# Patient Record
Sex: MALE | Race: WHITE | Employment: OTHER | URBAN - METROPOLITAN AREA
[De-identification: names, ages, dates, MRNs, and addresses within clinical notes are randomized per-mention and may not be internally consistent; named-entity substitution may affect disease eponyms.]

---

## 2021-06-28 ENCOUNTER — APPOINTMENT (OUTPATIENT)
Dept: GENERAL RADIOLOGY | Age: 71
End: 2021-06-28
Payer: COMMERCIAL

## 2021-06-28 ENCOUNTER — HOSPITAL ENCOUNTER (EMERGENCY)
Age: 71
Discharge: LEFT BEFORE DISCHARGE | End: 2021-06-28
Attending: EMERGENCY MEDICINE
Payer: COMMERCIAL

## 2021-06-28 VITALS
BODY MASS INDEX: 30.17 KG/M2 | SYSTOLIC BLOOD PRESSURE: 142 MMHG | TEMPERATURE: 96.9 F | OXYGEN SATURATION: 93 % | HEIGHT: 70 IN | DIASTOLIC BLOOD PRESSURE: 110 MMHG | RESPIRATION RATE: 34 BRPM | WEIGHT: 210.76 LBS | HEART RATE: 126 BPM

## 2021-06-28 DIAGNOSIS — I48.91 ATRIAL FIBRILLATION WITH RAPID VENTRICULAR RESPONSE (HCC): Primary | ICD-10-CM

## 2021-06-28 DIAGNOSIS — Z53.29 LEFT AGAINST MEDICAL ADVICE: ICD-10-CM

## 2021-06-28 DIAGNOSIS — I50.9 ACUTE CONGESTIVE HEART FAILURE, UNSPECIFIED HEART FAILURE TYPE (HCC): ICD-10-CM

## 2021-06-28 LAB
ANION GAP SERPL CALCULATED.3IONS-SCNC: 13 MMOL/L (ref 3–16)
BASOPHILS ABSOLUTE: 0 K/UL (ref 0–0.2)
BASOPHILS RELATIVE PERCENT: 0.5 %
BUN BLDV-MCNC: 24 MG/DL (ref 7–20)
CALCIUM SERPL-MCNC: 9.3 MG/DL (ref 8.3–10.6)
CHLORIDE BLD-SCNC: 107 MMOL/L (ref 99–110)
CO2: 20 MMOL/L (ref 21–32)
CREAT SERPL-MCNC: 0.9 MG/DL (ref 0.8–1.3)
EKG ATRIAL RATE: 197 BPM
EKG DIAGNOSIS: NORMAL
EKG Q-T INTERVAL: 236 MS
EKG QRS DURATION: 74 MS
EKG QTC CALCULATION (BAZETT): 417 MS
EKG R AXIS: 109 DEGREES
EKG T AXIS: 182 DEGREES
EKG VENTRICULAR RATE: 188 BPM
EOSINOPHILS ABSOLUTE: 0 K/UL (ref 0–0.6)
EOSINOPHILS RELATIVE PERCENT: 0.7 %
GFR AFRICAN AMERICAN: >60
GFR NON-AFRICAN AMERICAN: >60
GLUCOSE BLD-MCNC: 109 MG/DL (ref 70–99)
HCT VFR BLD CALC: 46.8 % (ref 40.5–52.5)
HEMOGLOBIN: 16 G/DL (ref 13.5–17.5)
LYMPHOCYTES ABSOLUTE: 1.6 K/UL (ref 1–5.1)
LYMPHOCYTES RELATIVE PERCENT: 24.1 %
MCH RBC QN AUTO: 31.6 PG (ref 26–34)
MCHC RBC AUTO-ENTMCNC: 34.3 G/DL (ref 31–36)
MCV RBC AUTO: 92.3 FL (ref 80–100)
MONOCYTES ABSOLUTE: 0.6 K/UL (ref 0–1.3)
MONOCYTES RELATIVE PERCENT: 8.9 %
NEUTROPHILS ABSOLUTE: 4.4 K/UL (ref 1.7–7.7)
NEUTROPHILS RELATIVE PERCENT: 65.8 %
PDW BLD-RTO: 13.5 % (ref 12.4–15.4)
PLATELET # BLD: 162 K/UL (ref 135–450)
PMV BLD AUTO: 8.5 FL (ref 5–10.5)
POTASSIUM REFLEX MAGNESIUM: 4.4 MMOL/L (ref 3.5–5.1)
PRO-BNP: 4651 PG/ML (ref 0–124)
RBC # BLD: 5.07 M/UL (ref 4.2–5.9)
SODIUM BLD-SCNC: 140 MMOL/L (ref 136–145)
T4 TOTAL: 10.4 UG/DL (ref 4.5–10.9)
TROPONIN: <0.01 NG/ML
TSH SERPL DL<=0.05 MIU/L-ACNC: 0.97 UIU/ML (ref 0.27–4.2)
WBC # BLD: 6.7 K/UL (ref 4–11)

## 2021-06-28 PROCEDURE — 93005 ELECTROCARDIOGRAM TRACING: CPT | Performed by: EMERGENCY MEDICINE

## 2021-06-28 PROCEDURE — 84436 ASSAY OF TOTAL THYROXINE: CPT

## 2021-06-28 PROCEDURE — 99285 EMERGENCY DEPT VISIT HI MDM: CPT

## 2021-06-28 PROCEDURE — 80048 BASIC METABOLIC PNL TOTAL CA: CPT

## 2021-06-28 PROCEDURE — 85025 COMPLETE CBC W/AUTO DIFF WBC: CPT

## 2021-06-28 PROCEDURE — 71046 X-RAY EXAM CHEST 2 VIEWS: CPT

## 2021-06-28 PROCEDURE — 96376 TX/PRO/DX INJ SAME DRUG ADON: CPT

## 2021-06-28 PROCEDURE — 83880 ASSAY OF NATRIURETIC PEPTIDE: CPT

## 2021-06-28 PROCEDURE — 2500000003 HC RX 250 WO HCPCS: Performed by: EMERGENCY MEDICINE

## 2021-06-28 PROCEDURE — 84443 ASSAY THYROID STIM HORMONE: CPT

## 2021-06-28 PROCEDURE — 96365 THER/PROPH/DIAG IV INF INIT: CPT

## 2021-06-28 PROCEDURE — 93010 ELECTROCARDIOGRAM REPORT: CPT | Performed by: INTERNAL MEDICINE

## 2021-06-28 PROCEDURE — 6370000000 HC RX 637 (ALT 250 FOR IP): Performed by: EMERGENCY MEDICINE

## 2021-06-28 PROCEDURE — 2580000003 HC RX 258: Performed by: EMERGENCY MEDICINE

## 2021-06-28 PROCEDURE — 96366 THER/PROPH/DIAG IV INF ADDON: CPT

## 2021-06-28 PROCEDURE — 71045 X-RAY EXAM CHEST 1 VIEW: CPT

## 2021-06-28 PROCEDURE — 84484 ASSAY OF TROPONIN QUANT: CPT

## 2021-06-28 RX ORDER — METRONIDAZOLE 7.5 MG/G
1 GEL TOPICAL 2 TIMES DAILY PRN
COMMUNITY

## 2021-06-28 RX ORDER — FUROSEMIDE 20 MG/1
20 TABLET ORAL DAILY
Qty: 5 TABLET | Refills: 0 | Status: SHIPPED | OUTPATIENT
Start: 2021-06-28 | End: 2021-08-05 | Stop reason: SDUPTHER

## 2021-06-28 RX ORDER — DILTIAZEM HYDROCHLORIDE 5 MG/ML
10 INJECTION INTRAVENOUS ONCE
Status: COMPLETED | OUTPATIENT
Start: 2021-06-28 | End: 2021-06-28

## 2021-06-28 RX ORDER — FUROSEMIDE 40 MG/1
40 TABLET ORAL ONCE
Status: COMPLETED | OUTPATIENT
Start: 2021-06-28 | End: 2021-06-28

## 2021-06-28 RX ORDER — DILTIAZEM HYDROCHLORIDE 180 MG/1
180 CAPSULE, COATED, EXTENDED RELEASE ORAL DAILY
Qty: 30 CAPSULE | Refills: 0 | Status: SHIPPED | OUTPATIENT
Start: 2021-06-28 | End: 2021-07-21 | Stop reason: SDUPTHER

## 2021-06-28 RX ORDER — LEVOTHYROXINE SODIUM 0.2 MG/1
200 TABLET ORAL DAILY
COMMUNITY
Start: 2021-04-03

## 2021-06-28 RX ORDER — POTASSIUM CHLORIDE 750 MG/1
10 TABLET, EXTENDED RELEASE ORAL DAILY
Qty: 5 TABLET | Refills: 0 | Status: SHIPPED | OUTPATIENT
Start: 2021-06-28 | End: 2021-07-03

## 2021-06-28 RX ORDER — FUROSEMIDE 10 MG/ML
20 INJECTION INTRAMUSCULAR; INTRAVENOUS ONCE
Status: DISCONTINUED | OUTPATIENT
Start: 2021-06-28 | End: 2021-06-28

## 2021-06-28 RX ORDER — FOLIC ACID 1 MG/1
1 TABLET ORAL DAILY
COMMUNITY

## 2021-06-28 RX ADMIN — DILTIAZEM HYDROCHLORIDE 10 MG/HR: 5 INJECTION INTRAVENOUS at 13:05

## 2021-06-28 RX ADMIN — FUROSEMIDE 40 MG: 40 TABLET ORAL at 15:37

## 2021-06-28 RX ADMIN — DILTIAZEM HYDROCHLORIDE 10 MG: 5 INJECTION INTRAVENOUS at 12:56

## 2021-06-28 ASSESSMENT — PAIN SCALES - GENERAL
PAINLEVEL_OUTOF10: 0
PAINLEVEL_OUTOF10: 0

## 2021-06-28 NOTE — ED NOTES
HR 160s at this time post cardizem bolus. Cardizem infusion initiated, see MAR. Pt is alert and oriented x4. Pt denies any pain. Call light within reach.       Marti Samaniego RN  06/28/21 0842

## 2021-06-28 NOTE — ED PROVIDER NOTES
[address] [facility]      Pt Name: Luz Goddard  MRN: [id number]  [place] 1950  Date of evaluation: 6/28/2021  Provider: Russell Morton, [address] [place] [address]       Chief Complaint   Patient presents with    Shortness of Breath     x 2-3 days. denies chest pain     Leg Swelling         HISTORY OF PRESENT ILLNESS   (Location/Symptom, Timing/Onset, Context/Setting, Quality, Duration, Modifying Factors, Severity)  Note limiting factors. Luz Goddard is a 73 y.o. male who presents to the emergency department with a complaint of feeling an irregular heartbeat for the last 2 to 3 days, worsening today. He reports progressively worsening shortness of breath and lower extremity swelling. He denies any associated chest pain heaviness pressure or tightness. He denies any fever or chills. No cough or sputum production. He denies any abdominal pain nausea vomiting or diarrhea. He denies any melena hematochezia. He denies any syncope dizziness or lightheadedness. He reports a similar episode 20 years ago at which time he was diagnosed with atrial fibrillation. He states that it was associated with his thyroid function. He currently takes Synthroid. He is compliant with his medications. He did require cardioversion at that time subacutely. He does not take any current anticoagulation or medications for A. fib. He does not see a cardiologist.    He denies any recent illness. He denies any recent heat exhaustion or heat exposure. Urine output has been normal.  Oral intake has been normal.    Nursing Notes were reviewed. HPI        REVIEW OF SYSTEMS    (2-9 systems for level 4, 10 or more for level 5)       Constitutional: Negative for fever or chills. HENT: Negative for rhinorrhea and sore throat. Eyes: Negative for redness or drainage. Gastrointestinal: Negative for abdominal pain. Negative for vomiting or diarrhea.    Genitourinary: Negative for flank pain. Negative for dysuria. Negative for hematuria. Neurological: Negative for headache. All systems are reviewed and are negative except for those listed above in the history of present illness and ROS. PAST MEDICAL HISTORY     Past Medical History:   Diagnosis Date    Thyroid disease          SURGICAL HISTORY     History reviewed. No pertinent surgical history. CURRENT MEDICATIONS       Previous Medications    FOLIC ACID (FOLVITE) 1 MG TABLET    Take 1 mg by mouth daily    LEVOTHYROXINE (SYNTHROID) 200 MCG TABLET    Take 200 mcg by mouth Daily     METRONIDAZOLE (METROGEL) 0.75 % GEL    Apply 1 g topically 2 times daily as needed (rosacea) Apply topically 2 times daily. ALLERGIES     Patient has no known allergies. FAMILY HISTORY     History reviewed. No pertinent family history. SOCIAL HISTORY       Social History     Socioeconomic History    Marital status:      Spouse name: None    Number of children: None    Years of education: None    Highest education level: None   Occupational History    None   Tobacco Use    Smoking status: Current Every Day Smoker     Types: Cigars    Smokeless tobacco: Never Used   Substance and Sexual Activity    Alcohol use: Yes     Comment: occ    Drug use: Never    Sexual activity: None   Other Topics Concern    None   Social History Narrative    None     Social Determinants of Health     Financial Resource Strain:     Difficulty of Paying Living Expenses:    Food Insecurity:     Worried About Running Out of Food in the Last Year:     Ran Out of Food in the Last Year:    Transportation Needs:     Lack of Transportation (Medical):      Lack of Transportation (Non-Medical):    Physical Activity:     Days of Exercise per Week:     Minutes of Exercise per Session:    Stress:     Feeling of Stress :    Social Connections:     Frequency of Communication with Friends and Family:     Frequency of Social Gatherings with Friends and Family:     Attends Alevism Services:     Active Member of Clubs or Organizations:     Attends Club or Organization Meetings:     Marital Status:    Intimate Partner Violence:     Fear of Current or Ex-Partner:     Emotionally Abused:     Physically Abused:     Sexually Abused:        SCREENINGS             PHYSICAL EXAM    (up to 7 for level 4, 8 or more for level 5)     ED Triage Vitals [06/28/21 1234]   BP Temp Temp Source Pulse Resp SpO2 Height Weight   127/79 96.9 °F (36.1 °C) Tympanic 136 17 99 % 5' 10\" (1.778 m) 210 lb 12.2 oz (95.6 kg)         Physical Exam   Constitutional: Awake and alert. Nontoxic. Awake and alert. Head: No visible evidence of trauma. Normocephalic. Eyes: Pupils equal and reactive. No photophobia. Conjunctiva normal.    HENT: Oral mucosa moist.  Airway patent. Pharynx without erythema. Nares were clear. Neck:  Soft and supple. Nontender. Heart: Rapid and irregular. Atrial fibrillation on the monitor. Rate 188. Lungs:  Clear to auscultation. No wheezes, rales, or ronchi. No conversational dyspnea or accessory muscle use. Chest: Chest wall non-tender. No evidence of trauma. Abdomen:  Soft, BMI 30.24. Nondistended, bowel sounds present. Nontender. No guarding rigidity or rebound. No masses. Musculoskeletal: Extremities non-tender with full range of motion. Radial and dorsalis pedis pulses were intact. Calf tenderness or erythema. There was 2-3+ bilateral lower extremity pitting edema extending to above the knees. Neurological: Alert and oriented x 3. Speech clear. Cranial nerves II-XII intact. No facial droop. No acute focal motor or sensory deficits. Skin: Skin is warm and dry. No rash. Lymphatic:  No lympadenopathy. Psychiatric: Normal mood and affect.  Behavior is normal.         DIAGNOSTIC RESULTS     EKG: All EKG's are interpreted by the Emergency Department Physician who either signs or Co-signs this chart in the absence of a cardiologist.    Atrial fibrillation with rapid ventricular response. Rate 188. QRS duration 74 ms. QTc 417 ms. R axis 109 degrees. No ST elevation. Nonspecific T wave abnormalities.     RADIOLOGY:   Non-plain film images such as CT, Ultrasound and MRI are read by the radiologist. Plain radiographic images are visualized and preliminarily interpreted by the emergency physician with the below findings:        Interpretation per the Radiologist below, if available at the time of this note:    XR CHEST PORTABLE   Final Result   Findings suggest mild interstitial and subpleural edema with postinflammatory   change in the right upper lobe               ED BEDSIDE ULTRASOUND:   Performed by ED Physician - none    LABS:  Labs Reviewed   BASIC METABOLIC PANEL W/ REFLEX TO MG FOR LOW K - Abnormal; Notable for the following components:       Result Value    CO2 20 (*)     Glucose 109 (*)     BUN 24 (*)     All other components within normal limits    Narrative:     Performed at:  [address] [employer] [address]   Phone [phone]   BRAIN NATRIURETIC PEPTIDE - Abnormal; Notable for the following components:    Pro-BNP 4,651 (*)     All other components within normal limits    Narrative:     Performed at:  [address] [employer] [address]   Phone [phone]   CBC WITH AUTO DIFFERENTIAL    Narrative:     Performed at:  [facility]  [address] [place] [address] [employer] [address]   Phone [phone]   TROPONIN    Narrative:     Performed at:  [facility]  [address] [place] [address] [email or web address] [address]   Phone [phone]   T4    Narrative:     Performed at:  [address] [email or web address] [address]   Phone [phone]   TSH WITHOUT REFLEX    Narrative:     Performed at:  [facility]  [address]   Phone [phone]       All other labs were within normal range or not returned as of this dictation. EMERGENCY DEPARTMENT COURSE and DIFFERENTIAL DIAGNOSIS/MDM:   Vitals:    Vitals:    06/28/21 1440 06/28/21 1516 06/28/21 1531 06/28/21 1538   BP:  121/85 (!) 142/110    Pulse: 147 135 143 126   Resp: 28 26 23 (!) 34   Temp:       TempSrc:       SpO2: 93%  93%    Weight:       Height:             MDM      The patient presents with palpitations for 2 to 3 days with progressively worsening shortness of breath and lower extremity edema. He is awake and alert. His blood pressure is 138/109. Heart rate is in the 180s. He is in atrial fibrillation on the monitor. EKG was obtained which confirms A. fib with RVR. Rate 188. No ST elevation. No associated chest pain. He was started on Cardizem bolus and drip. REASSESSMENT          2:01 PM: White blood cell count is 6.7. Hemoglobin 16.0. Creatinine 0.9. Chest x-ray shows mild increased interstitial markings consistent with congestive heart failure/pulmonary edema. He was given Lasix 20 mg IV. Heart rate is improved. Currently on 15 mg/h Cardizem drip his heart rate is 118-120. He is feeling improved. Is normal.  T4 and TSH are normal.  BNP is elevated at 4651. Patient will be admitted for further treatment and evaluation. 3:48 PM: The patient was feeling improved. Heart rate is 125 but he remains in atrial fibrillation with rapid ventricular response. He does have evidence of CHF. He is unsure of any exact weight gain but is certain that he has recently gained some weight. He will be admitted for further treatment and evaluation. He will need additional work-up with 2D echocardiogram, audiology evaluation, and possibly even ischemic work-up.   Troponin is normal.     I talked with the patient at length regarding his test results and need for admission, cardiology evaluation, and additional work-up, etc.  However, the patient is adamant that he cannot be admitted to the hospital and must be discharged immediately. He states that he is the primary caregiver for his wife who is at home and he cannot stay any longer. He refuses admission to the hospital.  He was advised of the risk of leaving [address] including but not limited to the possibility of delaying diagnosis, worsening of his condition, stroke, permanent disability, worsening congestive heart failure, myocardial infarction, or even sudden cardiac death. He is competent to make this decision. He demonstrates appropriate decision-making capacity. He verbalized complete understanding of the risk. He states \"I understand what you are saying, I know there is significant risk, but I absolutely cannot stay in the hospital, I need to leave right now\". Social work was consulted, and they evaluated the patient.  offered to place the patient's wife in respite care in a memory care unit while the patient is in the hospital.  He still declined and wishes to leave AMA. He will be given a referral to cardiology and was advised to call today for an appointment to be seen as soon as possible in 1 to 2 days. He was advised that he may return at any time to be admitted to the hospital.  If his condition worsens or new symptoms develop, he was advised to return immediately to the emergency department. I also advised him to follow-up with his primary care physician in 1 to 2 days for reexamination. He will be given a prescription for Cardizem 180 mg CD, once daily. He was advised to take his first dose as soon as he gets home. His [id number] 2 score is 3. Therefore, he will require anticoagulation and will be started on Eliquis 5 mg twice daily. There are no contraindications to anticoagulation.   He does have mild CHF and will be started on Lasix 20 mg daily as well as potassium chloride 10 mEq daily for 5 days. I advised him that treatment with these medications certainly does not indicate adequate or thorough evaluation for his congestive heart failure and that additional diuretics may be needed as well as additional medications as directed by the cardiologist.  He was advised of the importance of follow-up. He states that he plans to return to the emergency department for admission as soon as he can make arrangements for someone to stay with his wife. CRITICAL CARE TIME   Total Critical Care time was 45 minutes, excluding separately reportable procedures. There was a high probability of clinically significant/life threatening deterioration in the patient's condition which required my urgent intervention. CONSULTS:  None    PROCEDURES:  Unless otherwise noted below, none     Procedures        FINAL IMPRESSION      1. Atrial fibrillation with rapid ventricular response ([address]    2. Acute congestive heart failure, unspecified heart failure type ([address]    3. Left against medical advice          DISPOSITION/PLAN   DISPOSITION        PATIENT REFERRED TO:  Briant Paget, [address]  [phone]    Call today        DISCHARGE MEDICATIONS:  New Prescriptions    APIXABAN (ELIQUIS) 5 MG TABS TABLET    Take 1 tablet by mouth 2 times daily    DILTIAZEM (CARDIZEM CD) 180 MG EXTENDED RELEASE CAPSULE    Take 1 capsule by mouth daily    FUROSEMIDE (LASIX) 20 MG TABLET    Take 1 tablet by mouth daily    POTASSIUM CHLORIDE (KLOR-CON M) 10 MEQ EXTENDED RELEASE TABLET    Take 1 tablet by mouth daily for 5 days     Controlled Substances Monitoring:     No flowsheet data found. (Please note that portions of this note were completed with a voice recognition program.  Efforts were made to edit the dictations but occasionally words are mis-transcribed. )    RYLAND Agosto DO (electronically signed)  Attending Emergency Physician          Joe Jay Kirsten Craig, DO  06/28/21 [address] [facility], DO  06/28/21 8852

## 2021-06-28 NOTE — LETTER
[facility] Emergency Department  [address] [address]  Phone: [phone]    Patient: Tay Rubio  YOB: 1950  Date: 6/28/2021 Time: 3:35 PM    Leaving the Hospital Against Medical Advice    Chart #:[id number]    This will certify that I, the undersigned,    ______________________________________________________________________    A patient in the above named medical center, having requested discharge and removal from the medical center against the advice of my attending physician(s), hereby release the Emergency Department, its physicians, officers and employees, severally and individually, from any and all liability of any nature whatsoever for any injury or harm or complication of any kind that may result directly or indirectly, by reason of my terminating my stay as a patient from [facility], and hereby waive any and all rights of action I may now have or later acquire as a result of my voluntary departure from [facility] and the termination of my stay as a patient therein. This release is made with the full knowledge of the danger that may result from the action which I am taking.       Date:_______________________                         ___________________________                                                                                    Patient/Legal Representative    Witness:        ____________________________                          ___________________________  Nurse                                                                        Physician

## 2021-06-28 NOTE — ED NOTES
Discharge instructions and prescriptions discussed/given to pt, all questions answered. Pt is alert and oriented x4, skin warm and dry, respirations even and easy. Pt left ambulatory, steady gait.       Lilian Dominguez RN  06/28/21 1600

## 2021-06-28 NOTE — ED NOTES
Aure from Social work at bedside providing pt with resources at this time.      Darline Negrete RN  06/28/21 2460

## 2021-06-28 NOTE — PROGRESS NOTES
Medication Reconciliation    List of medications patient is currently taking is complete. Source of information: 1. Conversation with patient at bedside                                      2. EPIC records      Allergies  Patient has no known allergies. Notes regarding home medications:   1. Patient's only routine maintenance medications are Levothyroxine and Folic acid; he received both of these earlier this morning. Last TSH = 0.75 (01/2021).     Brooklyn Larson RPH, PharmD, BCPS  6/28/2021 1:13 PM

## 2021-06-28 NOTE — ED NOTES
Patient arrived to ED via private vehicle. Patient reports swelling to the bilateral lower extremities x3-4 days. Patient reports increased SOB today. Patient reports 20 years ago he had an episode of \"cardiac problems when my thyroid was not working right. \" Patient reports he is going to refuse admission d/t his wife being at home alone and she has Alzheimer. , Aure, spoke with patient but patient is refusing any assistance at this time. Patient made aware of the severity of him leaving. Patient reports, \"I will stay an hour or two then see what happens. \"       Shahab Granados RN  06/28/21 6723

## 2021-06-28 NOTE — ED NOTES
Pt still adamant about going home despite condition. Rae Rogers DO discussing risks of leaving at this time. Pt still requesting to get home.       Lilian Dominguez RN  06/28/21 4902

## 2021-07-21 NOTE — TELEPHONE ENCOUNTER
Flower Pineda called in this morning stating he was referred to Dr. Bridger Green after an ED visit. Flower Pineda stated he wants an appointment right away because he needs medication. Flower Boweners was in the ED for feet swelling, SOB, and afib. Please advise where to put this patient.        You can reach Flower Pineda at [phone]

## 2021-07-21 NOTE — TELEPHONE ENCOUNTER
Spoke with patient, he is scheduled on 08/05 at 2:30pm.     Alfredito Serrano stated he needs a refill before his appointment and wanted to know if Dr. Sunil Jay is able to refill them.      dilTIAZem (CARDIZEM CD) 180 MG extended release capsule     apixaban (ELIQUIS) 5 MG TABS tablet

## 2021-07-22 RX ORDER — DILTIAZEM HYDROCHLORIDE 180 MG/1
180 CAPSULE, COATED, EXTENDED RELEASE ORAL DAILY
Qty: 30 CAPSULE | Refills: 0 | Status: SHIPPED | OUTPATIENT
Start: 2021-07-22 | End: 2021-08-18 | Stop reason: SDUPTHER

## 2021-08-05 ENCOUNTER — OFFICE VISIT (OUTPATIENT)
Dept: CARDIOLOGY CLINIC | Age: 71
End: 2021-08-05
Payer: COMMERCIAL

## 2021-08-05 VITALS
HEIGHT: 70 IN | OXYGEN SATURATION: 95 % | WEIGHT: 206 LBS | SYSTOLIC BLOOD PRESSURE: 120 MMHG | BODY MASS INDEX: 29.49 KG/M2 | HEART RATE: 75 BPM | DIASTOLIC BLOOD PRESSURE: 78 MMHG

## 2021-08-05 DIAGNOSIS — R40.0 DAYTIME SOMNOLENCE: ICD-10-CM

## 2021-08-05 DIAGNOSIS — I50.22 CHRONIC SYSTOLIC CHF (CONGESTIVE HEART FAILURE), NYHA CLASS 3 (HCC): ICD-10-CM

## 2021-08-05 DIAGNOSIS — E03.8 OTHER SPECIFIED HYPOTHYROIDISM: ICD-10-CM

## 2021-08-05 DIAGNOSIS — I48.91 ATRIAL FIBRILLATION WITH RVR (HCC): Primary | ICD-10-CM

## 2021-08-05 PROCEDURE — G8417 CALC BMI ABV UP PARAM F/U: HCPCS | Performed by: INTERNAL MEDICINE

## 2021-08-05 PROCEDURE — G8427 DOCREV CUR MEDS BY ELIG CLIN: HCPCS | Performed by: INTERNAL MEDICINE

## 2021-08-05 PROCEDURE — 93000 ELECTROCARDIOGRAM COMPLETE: CPT | Performed by: INTERNAL MEDICINE

## 2021-08-05 PROCEDURE — 99205 OFFICE O/P NEW HI 60 MIN: CPT | Performed by: INTERNAL MEDICINE

## 2021-08-05 RX ORDER — FUROSEMIDE 40 MG/1
40 TABLET ORAL DAILY
Qty: 30 TABLET | Refills: 5 | Status: SHIPPED | OUTPATIENT
Start: 2021-08-05 | End: 2022-01-26 | Stop reason: SDUPTHER

## 2021-08-05 RX ORDER — AMIODARONE HYDROCHLORIDE 200 MG/1
200 TABLET ORAL 2 TIMES DAILY
Qty: 60 TABLET | Refills: 3 | Status: SHIPPED | OUTPATIENT
Start: 2021-08-05 | End: 2022-10-05 | Stop reason: ALTCHOICE

## 2021-08-05 NOTE — PROGRESS NOTES
Types: Cigars    Smokeless tobacco: Never Used   Substance Use Topics    Alcohol use: Yes     Comment: occ    Drug use: Never       No Known Allergies  Current Outpatient Medications   Medication Sig Dispense Refill    dilTIAZem (CARDIZEM CD) 180 MG extended release capsule Take 1 capsule by mouth daily 30 capsule 0    apixaban (ELIQUIS) 5 MG TABS tablet Take 1 tablet by mouth 2 times daily 60 tablet 0    levothyroxine (SYNTHROID) 200 MCG tablet Take 200 mcg by mouth Daily       metroNIDAZOLE (METROGEL) 0.75 % gel Apply 1 g topically 2 times daily as needed (rosacea) Apply topically 2 times daily.  folic acid (FOLVITE) 1 MG tablet Take 1 mg by mouth daily      furosemide (LASIX) 20 MG tablet Take 1 tablet by mouth daily 5 tablet 0    potassium chloride (KLOR-CON M) 10 MEQ extended release tablet Take 1 tablet by mouth daily for 5 days 5 tablet 0     No current facility-administered medications for this visit. [id number] Score for Atrial Fibrillation Stroke Risk   Risk   Factors  Component Value   C CHF Yes 1   H HTN No 0   A2 Age >= 76 No,  (77 y.o.) 0   D DM No 0   S2 Prior Stroke/TIA No 0   V Vascular Disease No 0   A Age 74-69 Yes,  (77 y.o.) 1   Sc Sex male 0    [id number]  Score  2   Score last updated 8/5/21 02:85 PM EDT    Click here for a link to the UpToDate guideline \"Atrial Fibrillation: Anticoagulation therapy to prevent embolization    Disclaimer: Risk Score calculation is dependent on accuracy of patient problem list and past encounter diagnosis. Physical Exam:   /78 (Site: Right Upper Arm, Position: Sitting)   Pulse 75   Ht 5' 10\" (1.778 m)   Wt 206 lb (93.4 kg)   SpO2 95%   BMI 29.56 kg/m²   No intake or output data in the 24 hours ending 08/05/21 1446  Wt Readings from Last 2 Encounters:   08/05/21 206 lb (93.4 kg)   06/28/21 210 lb 12.2 oz (95.6 kg)     Constitutional: He is oriented to person, place, and time. He appears well-developed and well-nourished.  In no acute distress. Head: Normocephalic and atraumatic. Neck: Neck supple. No JVD present. Carotid bruit is not present. No mass and no thyromegaly present. No lymphadenopathy present. Cardiovascular: Irreg irreg and tachy, normal heart sounds and intact distal pulses. Exam reveals no gallop and no friction rub. No murmur heard. Pulmonary/Chest: Effort normal and breath sounds normal. No respiratory distress. He has no wheezes, rhonchi or rales. Abdominal: Soft, non-tender. Bowel sounds and aorta are normal. He exhibits no organomegaly, mass or bruit. Extremities: No edema, cyanosis, or clubbing. Pulses are 2+ radial/carotid/dorsalis pedis and posterior tibial bilaterally. Neurological: He is alert and oriented to person, place, and time. He has normal reflexes. No cranial nerve deficit. Coordination normal.   Skin: Skin is warm and dry. There is no rash or diaphoresis. Psychiatric: He has a normal mood and affect. His speech is normal and behavior is normal.     EKG Interpretation 8/5/21:  Atrial fibrillation with RVR      Procedures:     DCCV 7/15/2004 ([facility])   PROCEDURE: Cardioversion.     After adequate sedation with 95 mg of Diprivan and 25 mg of Lidocaine he was given 200   joules of synchronized energy with successful restoration of sinus rhythm. The patient   tolerated the procedure well without complications.         Imaging:     Echo 6/17/2002 ([facility])   Technically adequate quality study.     There is marked biatrial enlargement.  Left and right ventricles are normal in size.       There is moderate concentric left ventricular hypertrophy.  Left ventricular systolic   function is at lower limits of normal.  There is global moderate left ventricular   dysfunction, without focal wall motion abnormalities.  The ejection fraction is calculated   at 34%, but is close to 30%.        There is no intracardiac mass, thrombi, or pericardial effusion.     The aortic valve has three cusps and opens normally.  The mitral and tricuspid valves are   grossly normal.  There is mild mitral and tricuspid valvular insufficiency. Mary An is   trace aortic insufficiency.     There is a tall A wave, consistent with diastolic dysfunction.     Right ventricular systolic pressure is 37 mmHg.         Lab Review:   No results found for: TRIG, HDL, LDLCALC, LDLDIRECT, LABVLDL  Lab Results   Component Value Date     06/28/2021    K 4.4 06/28/2021    BUN 24 06/28/2021    CREATININE 0.9 06/28/2021         Assessment:  1. Atrial fibrillation with RVR   2. Hypothyroidism   3. Chronic systolic CHF, class 3    4. Daytime Somnolence    Plan:  He does present in atrial fibrillation with RVR today by EKG. I will have him begin taking amiodarone 200 mg BID and I recommend the patient pursue a direct current cardioversion. The risks, benefits and alternatives to the procedure were discussed with the patient. The risks include but are not limited to: stroke, respiratory failure, arrhythmia and death. His LVEF was previously reduced and he does have lower extremity swelling. I will have him begin taking Lasix 40 mg daily and repeat an echocardiogram to assess his EF. I have also placed a referral to [facility] to be screened for sleep apnea. I will see him in the office following procedure. This note was scribed in the presence of Arnaldo Bhatti MD by [employer], RN. Physician Attestation:  The scribes documentation has been prepared under my direction and personally reviewed by me in its entirety. I, Dr. Valentino Dukes personally performed the services described in this documentation as scribed by my RN in my presence, and I confirm that the note above accurately reflects all work, treatment, procedures, and medical decision making performed by me.

## 2021-08-05 NOTE — PATIENT INSTRUCTIONS
The morning of your procedure you will park in the hospital parking lot and report directly to the cath lab to check in.     Pre-Procedure Instructions   1. Do not eat or drink anything after midnight the night before your procedure. 2. Hold all diabetic medications including, Metfomin. All other medications can be taken in the morning with sips of water. 3. Do not use any lotions, creams or perfume the morning of procedure. 4. Pre-procedure lab work will need to be completed 5-7 days prior to procedure. 5. Please have a responsible adult to drive you home after procedure. It is recommended you do not drive for 24 hours after procedure and that someone stay with you for precautionary measures. 6. Cath lab will provide you with all post procedure instructions.      If you have any questions regarding the procedure itself or medications please call [phone] and ask to speak with a nurse

## 2021-08-06 ENCOUNTER — TELEPHONE (OUTPATIENT)
Dept: CARDIOLOGY CLINIC | Age: 71
End: 2021-08-06

## 2021-08-06 NOTE — TELEPHONE ENCOUNTER
Dr. Narayan Gonzalez     Patient seen in office yesterday and started on amiodarone 200 mg BID and discussed pursuing CV next week. Patient cares for his wife with dementia and has no other family members to give him a ride home following procedure. Please advise.

## 2021-08-06 NOTE — LETTER
[facility] HEART INST [facility]  Phone: [phone]  Fax: [phone]    Enrrique Cervantes MD        October 13, 2021    [facility] [address]      Dear Lalo Ruiz: We have been trying to contact you regarding follow up in our office. Please contact our office at [phone] to schedule and office visit with Dr. Buena Merlin.

## 2021-08-10 NOTE — TELEPHONE ENCOUNTER
Discussed with Dr. Lennie Underwood and email sent to cath lab manager regarding the lack of transportation.

## 2021-08-13 NOTE — TELEPHONE ENCOUNTER
Per Tremaine Isidro in the cath lab, patient can be arranged for Lyft transportation to complete cardioversion. Tried to reach patient. No answer and unable to leave a VM.

## 2021-08-18 RX ORDER — DILTIAZEM HYDROCHLORIDE 180 MG/1
180 CAPSULE, COATED, EXTENDED RELEASE ORAL DAILY
Qty: 30 CAPSULE | Refills: 3 | Status: SHIPPED | OUTPATIENT
Start: 2021-08-18 | End: 2021-12-08 | Stop reason: SDUPTHER

## 2021-08-18 NOTE — TELEPHONE ENCOUNTER
Spoke with Dusty Lama. He would like to wait a few more weeks to complete CV. Discussed that it would be preferred for him to complete this earlier. He continues to state he would like for this to wait. Will reach out to patient next week to schedule CV. He v/u.

## 2021-09-08 ENCOUNTER — HOSPITAL ENCOUNTER (OUTPATIENT)
Dept: NON INVASIVE DIAGNOSTICS | Age: 71
Discharge: HOME | End: 2021-09-08
Payer: MEDICARE

## 2021-09-08 DIAGNOSIS — I48.91 ATRIAL FIBRILLATION WITH RVR (HCC): ICD-10-CM

## 2021-09-08 DIAGNOSIS — R40.0 DAYTIME SOMNOLENCE: ICD-10-CM

## 2021-09-08 LAB
LV EF: 35 %
LVEF MODALITY: NORMAL

## 2021-09-08 PROCEDURE — 93306 TTE W/DOPPLER COMPLETE: CPT

## 2021-09-21 NOTE — TELEPHONE ENCOUNTER
Patient continues to have reservations regarding completing CV and would like to think about this longer. I did discuss with him that his reduced EF could be related to him begin in afib for an extended period of time. He continues to decline CV and would like a return call next week.

## 2021-10-01 NOTE — TELEPHONE ENCOUNTER
Discussed with Dr. Lissette Schuler and patient can be seen in the office to discuss further as he has not been willing to complete CV.

## 2021-10-13 NOTE — TELEPHONE ENCOUNTER
Attempted to reach patient again. VM full. Letter sent to patient regarding contacting our office and scheduling f/u.

## 2021-10-20 NOTE — TELEPHONE ENCOUNTER
Pt returned call and states that he needs to pay some other bills off before making another appt- He stated he will call back when he is able make an appt

## 2021-12-08 NOTE — TELEPHONE ENCOUNTER
Pt is scheduled to see Dr. Chaparrita Watkins 12/14  Medication Refill    Medication needing refilled:  dilTIAZem (CARDIZEM CD)      Dosage of the medication:  180 MG extended release capsule       How are you taking this medication (QD, BID, TID, QID, PRN):  Take 1 capsule by mouth daily    30 or 90 day supply called in:  30    When will you run out of your medication:  He is out     Which Pharmacy are we sending the medication to?:    [facility], [address] [phone]   [address] [facility], [address] [place] [address],[address]   Phone:  [phone]  Fax:  [phone]

## 2021-12-09 RX ORDER — DILTIAZEM HYDROCHLORIDE 180 MG/1
180 CAPSULE, COATED, EXTENDED RELEASE ORAL DAILY
Qty: 60 CAPSULE | Refills: 2 | Status: SHIPPED | OUTPATIENT
Start: 2021-12-09 | End: 2022-05-17

## 2022-01-25 NOTE — TELEPHONE ENCOUNTER
Pt is calling in wanting a refill on his Eliquis and furosemide. I do not see Eliquis on his medication list, but pt states he takes 5 mg once a day. Please clarify and if he does need to still be taking it send a refill to [facility].        Medication Refill    Medication needing refilled:  Furosemide    Dosage of the medication:  40 mg tablet    How are you taking this medication (QD, BID, TID, QID, PRN):  Take 1 tablet by mouth daily    30 or 90 day supply called in:  80      Which Pharmacy are we sending the medication to?:    [facility] [address]   [address] [facility], [address] [place], [place] [address]   Phone:  [phone]  Fax:  [phone]

## 2022-01-26 RX ORDER — FUROSEMIDE 40 MG/1
40 TABLET ORAL DAILY
Qty: 30 TABLET | Refills: 3 | Status: SHIPPED | OUTPATIENT
Start: 2022-01-26 | End: 2022-10-05 | Stop reason: SDUPTHER

## 2022-01-26 NOTE — TELEPHONE ENCOUNTER
Last OV: 08/05/2021  Next OV: x  Recent labs: 06/28/2021      Called patient and he states he has been taking eliquis 5 mg bid. Patient also states he will give us a call back to schedule follow up. Medication list updated.

## 2022-05-17 RX ORDER — DILTIAZEM HYDROCHLORIDE 180 MG/1
CAPSULE, COATED, EXTENDED RELEASE ORAL
Qty: 60 CAPSULE | Refills: 0 | Status: SHIPPED | OUTPATIENT
Start: 2022-05-17 | End: 2022-08-12

## 2022-08-12 RX ORDER — DILTIAZEM HYDROCHLORIDE 180 MG/1
CAPSULE, COATED, EXTENDED RELEASE ORAL
Qty: 60 CAPSULE | Refills: 0 | Status: SHIPPED | OUTPATIENT
Start: 2022-08-12 | End: 2022-10-05 | Stop reason: SDUPTHER

## 2022-10-05 ENCOUNTER — OFFICE VISIT (OUTPATIENT)
Dept: CARDIOLOGY CLINIC | Age: 72
End: 2022-10-05
Payer: COMMERCIAL

## 2022-10-05 VITALS
BODY MASS INDEX: 29.78 KG/M2 | WEIGHT: 208 LBS | OXYGEN SATURATION: 95 % | SYSTOLIC BLOOD PRESSURE: 130 MMHG | HEIGHT: 70 IN | DIASTOLIC BLOOD PRESSURE: 84 MMHG | HEART RATE: 117 BPM

## 2022-10-05 DIAGNOSIS — I50.22 CHRONIC SYSTOLIC CHF (CONGESTIVE HEART FAILURE), NYHA CLASS 2 (HCC): ICD-10-CM

## 2022-10-05 DIAGNOSIS — I48.19 PERSISTENT ATRIAL FIBRILLATION (HCC): Primary | ICD-10-CM

## 2022-10-05 PROCEDURE — 3017F COLORECTAL CA SCREEN DOC REV: CPT | Performed by: INTERNAL MEDICINE

## 2022-10-05 PROCEDURE — 93000 ELECTROCARDIOGRAM COMPLETE: CPT | Performed by: INTERNAL MEDICINE

## 2022-10-05 PROCEDURE — 1123F ACP DISCUSS/DSCN MKR DOCD: CPT | Performed by: INTERNAL MEDICINE

## 2022-10-05 PROCEDURE — G8417 CALC BMI ABV UP PARAM F/U: HCPCS | Performed by: INTERNAL MEDICINE

## 2022-10-05 PROCEDURE — G8484 FLU IMMUNIZE NO ADMIN: HCPCS | Performed by: INTERNAL MEDICINE

## 2022-10-05 PROCEDURE — 1036F TOBACCO NON-USER: CPT | Performed by: INTERNAL MEDICINE

## 2022-10-05 PROCEDURE — G8427 DOCREV CUR MEDS BY ELIG CLIN: HCPCS | Performed by: INTERNAL MEDICINE

## 2022-10-05 PROCEDURE — 99214 OFFICE O/P EST MOD 30 MIN: CPT | Performed by: INTERNAL MEDICINE

## 2022-10-05 RX ORDER — FUROSEMIDE 40 MG/1
40 TABLET ORAL DAILY
Qty: 30 TABLET | Refills: 3 | Status: SHIPPED | OUTPATIENT
Start: 2022-10-05

## 2022-10-05 RX ORDER — DILTIAZEM HYDROCHLORIDE 360 MG/1
360 CAPSULE, EXTENDED RELEASE ORAL DAILY
Qty: 90 CAPSULE | Refills: 3 | Status: SHIPPED | OUTPATIENT
Start: 2022-10-05

## 2022-10-05 NOTE — PROGRESS NOTES
[address] [facility]  1950 October 5, 2022      CC: \"I feel great\"     HPI:  The patient is 81 y.o. male with a past medical history significant for atrial fibrillation and hypothyroidism. He presented to the ED with palpitations. He was found to be in atrial fibrillation with RVR and also demonstrated symptoms of heart failure with lower extremity swelling and shortness of breath. He refused admission and left AMA. He does have a history of known atrial fibrillation dating back to 2004 and underwent successful cardioversion on 7/15/2004 with Dr. Flaca Tirado at [address] [place] [address]. He was seen in the office 8/5/21 and found in atrial fibrillation with RVR and a cardioversion was discussed. He did not wish to pursue this. He presents today for follow up. He reports feeling well overall. He continues to not want to pursue cardioversion. He never started amiodarone due to fear of side effects. He denies chest pain with rest or exertion. His breathing has been comfortable without shortness of breath. His able to climb the stairs in his home often during the day and denies exertional symptoms. Review of Systems:  Constitutional: No fatigue, weakness, night sweats or fever. HEENT: No new vision difficulties or ringing in the ears. Respiratory: No new SOB, PND, orthopnea or cough. Cardiovascular: See HPI   GI: No n/v, diarrhea, constipation, abdominal pain or changes in bowel habits. No melena, no hematochezia  : No urinary frequency, urgency, incontinence, hematuria or dysuria. Skin: No cyanosis or skin lesions. Musculoskeletal: No new muscle or joint pain. Neurological: No syncope or TIA-like symptoms. Psychiatric: No anxiety, insomnia or depression     Past Medical History:   Diagnosis Date    Thyroid disease      No past surgical history on file. No family history on file.   Social History     Tobacco Use    Smoking status: Former     Types: Cigars    Smokeless tobacco: Never   Substance Use Topics    Alcohol use: Yes     Comment: occ    Drug use: Never       No Known Allergies  Current Outpatient Medications   Medication Sig Dispense Refill    dilTIAZem (CARDIZEM CD) 180 MG extended release capsule TAKE ONE CAPSULE BY MOUTH DAILY 60 capsule 0    furosemide (LASIX) 40 MG tablet Take 1 tablet by mouth daily 30 tablet 3    apixaban (ELIQUIS) 5 MG TABS tablet Take 1 tablet by mouth 2 times daily 60 tablet 3    levothyroxine (SYNTHROID) 200 MCG tablet Take 200 mcg by mouth Daily       metroNIDAZOLE (METROGEL) 0.75 % gel Apply 1 g topically 2 times daily as needed (rosacea) Apply topically 2 times daily. folic acid (FOLVITE) 1 MG tablet Take 1 mg by mouth daily      potassium chloride (KLOR-CON M) 10 MEQ extended release tablet Take 1 tablet by mouth daily for 5 days 5 tablet 0     No current facility-administered medications for this visit. [id number] Score for Atrial Fibrillation Stroke Risk   Risk   Factors  Component Value   C CHF Yes 1   H HTN No 0   A2 Age >= 76 No,  (71 y.o.) 0   D DM No 0   S2 Prior Stroke/TIA No 0   V Vascular Disease No 0   A Age 74-69 Yes,  (71 y.o.) 1   Sc Sex male 0    [id number]  Score  2   Score last updated 10/5/22 47:57 PM EDT    Click here for a link to the UpToDate guideline \"Atrial Fibrillation: Anticoagulation therapy to prevent embolization    Disclaimer: Risk Score calculation is dependent on accuracy of patient problem list and past encounter diagnosis. Physical Exam:   /84   Pulse (!) 117   Ht 5' 10\" (1.778 m)   Wt 208 lb (94.3 kg)   SpO2 95%   BMI 29.84 kg/m²   No intake or output data in the 24 hours ending 10/05/22 1403  Wt Readings from Last 2 Encounters:   10/05/22 208 lb (94.3 kg)   08/05/21 206 lb (93.4 kg)     Constitutional: He is oriented to person, place, and time. He appears well-developed and well-nourished. In no acute distress. Head: Normocephalic and atraumatic. Neck: Neck supple. No JVD present. Carotid bruit is not present. No mass and no thyromegaly present. No lymphadenopathy present. Cardiovascular: Irreg irreg and tachy, normal heart sounds and intact distal pulses. Exam reveals no gallop and no friction rub. No murmur heard. Pulmonary/Chest: Effort normal and breath sounds normal. No respiratory distress. He has no wheezes, rhonchi or rales. Abdominal: Soft, non-tender. Bowel sounds and aorta are normal. He exhibits no organomegaly, mass or bruit. Extremities: No edema, cyanosis, or clubbing. Pulses are 2+ radial/carotid/dorsalis pedis and posterior tibial bilaterally. Neurological: He is alert and oriented to person, place, and time. He has normal reflexes. No cranial nerve deficit. Coordination normal.   Skin: Skin is warm and dry. There is no rash or diaphoresis. Psychiatric: He has a normal mood and affect. His speech is normal and behavior is normal.     Personally reviewed and interpreted   EKG Interpretation 8/5/21:  Atrial fibrillation with RVR  EKG Interpretation 10/5/22: Atrial fibrillation with  bpm       Procedures:     DCCV 7/15/2004 ([facility])   PROCEDURE: Cardioversion. After adequate sedation with 95 mg of Diprivan and 25 mg of Lidocaine he was given 200   joules of synchronized energy with successful restoration of sinus rhythm. The patient   tolerated the procedure well without complications. Imaging:     Echo 6/17/2002 ([facility])   Technically adequate quality study. There is marked biatrial enlargement. Left and right ventricles are normal in size. There is moderate concentric left ventricular hypertrophy. Left ventricular systolic   function is at lower limits of normal.  There is global moderate left ventricular   dysfunction, without focal wall motion abnormalities. The ejection fraction is calculated   at 34%, but is close to 30%. There is no intracardiac mass, thrombi, or pericardial effusion.      The aortic valve has three cusps and opens normally. The mitral and tricuspid valves are   grossly normal.  There is mild mitral and tricuspid valvular insufficiency. There is   trace aortic insufficiency. There is a tall A wave, consistent with diastolic dysfunction. Right ventricular systolic pressure is 37 mmHg. Echo 9/8/21   Overall left ventricular systolic function appears moderately reduced. Ejection fraction is visually estimated to be 30-40%  No regional wall motion abnormalities are noted. Indeterminate diastolic function due to abnormal rhythm. The right ventricle is mildly dilated. Right ventricular systolic function appears mildly reduced, however,  possibly underestimated due to abnormal rhythm. Lab Review:   No results found for: TRIG, HDL, LDLCALC, LDLDIRECT, LABVLDL  Lab Results   Component Value Date/Time     06/28/2021 12:51 PM    K 4.4 06/28/2021 12:51 PM    BUN 24 06/28/2021 12:51 PM    CREATININE 0.9 06/28/2021 12:51 PM         Assessment:  1. Atrial fibrillation, persistent    2. Hypothyroidism   3. Chronic systolic CHF, class 3    4. Daytime Somnolence    Plan:  He remains in atrial fibrillation by EKG today and does not wish to pursue a cardioversion. His heart rate remains elevated and I will have him increase his dose of Cardizem to 360 mg daily. He is overall well compensated on exam despite being in atrial fibrillation. He will continue Eliquis 5 mg BID for stroke risk reduction. I will have him complete an echocardiogram to reassess his LVEF. I have personally reviewed all previous testing for this visit today including imaging, lab results and EKG as detailed above. I explained to him that his heart rate is elevated and his heart's pumping function could be improved with the cardioversion. He again declines it. I will see him in the office for follow up in 6 months. This note was scribed in the presence of Veria Kayser, MD by [employer], RN.   Physician Attestation:  The scribes documentation has been prepared under my direction and personally reviewed by me in its entirety. I, Dr. Zora Toribio personally performed the services described in this documentation as scribed by my RN in my presence, and I confirm that the note above accurately reflects all work, treatment, procedures, and medical decision making performed by me.

## 2023-05-01 NOTE — PROGRESS NOTES
[address] [facility]  1950    May 2, 2023      CC: \"Everything is good\"     HPI:  The patient is 81 y.o. male with a past medical history significant for atrial fibrillation and hypothyroidism. He presented to the ED with palpitations. He was found to be in atrial fibrillation with RVR and also demonstrated symptoms of heart failure with lower extremity swelling and shortness of breath. He refused admission and left AMA. He does have a history of known atrial fibrillation dating back to 2004 and underwent successful cardioversion on 7/15/2004 with Dr. Sandra Rivas at [facility]. He was seen in the office 8/5/21 and found in atrial fibrillation with RVR and a cardioversion was discussed. He did not wish to pursue this. He continues to decline pursuing a cardioversion. He never started amiodarone due to fear of side effects. Today he presents for follow up and states that overall he is feeling well. He denies any new sounding cardiac complaints. He denies any chest pains or worsening shortness of breath. He reports medication compliance and is tolerating. He denies any abnormal bleeding or bruising. He denies exertional chest pain/pressure, dyspnea at rest, worsening PURCELL, PND, orthopnea, palpitations, lightheadedness, weight changes, changes in LE edema, and syncope. Review of Systems:  Constitutional: No fatigue, weakness, night sweats or fever. HEENT: No new vision difficulties or ringing in the ears. Respiratory: No new SOB, PND, orthopnea or cough. Cardiovascular: See HPI   GI: No n/v, diarrhea, constipation, abdominal pain or changes in bowel habits. No melena, no hematochezia  : No urinary frequency, urgency, incontinence, hematuria or dysuria. Skin: No cyanosis or skin lesions. Musculoskeletal: No new muscle or joint pain. Neurological: No syncope or TIA-like symptoms.   Psychiatric: No anxiety, insomnia or depression     Past Medical History:   Diagnosis Date

## 2023-05-02 ENCOUNTER — OFFICE VISIT (OUTPATIENT)
Dept: CARDIOLOGY CLINIC | Age: 73
End: 2023-05-02
Payer: MEDICARE

## 2023-05-02 VITALS
OXYGEN SATURATION: 94 % | HEIGHT: 70 IN | DIASTOLIC BLOOD PRESSURE: 84 MMHG | SYSTOLIC BLOOD PRESSURE: 128 MMHG | BODY MASS INDEX: 30.92 KG/M2 | WEIGHT: 216 LBS | HEART RATE: 66 BPM

## 2023-05-02 DIAGNOSIS — I50.22 CHRONIC SYSTOLIC CHF (CONGESTIVE HEART FAILURE), NYHA CLASS 2 (HCC): ICD-10-CM

## 2023-05-02 DIAGNOSIS — E03.8 OTHER SPECIFIED HYPOTHYROIDISM: ICD-10-CM

## 2023-05-02 DIAGNOSIS — I48.19 PERSISTENT ATRIAL FIBRILLATION (HCC): Primary | ICD-10-CM

## 2023-05-02 PROCEDURE — 1123F ACP DISCUSS/DSCN MKR DOCD: CPT | Performed by: INTERNAL MEDICINE

## 2023-05-02 PROCEDURE — 99214 OFFICE O/P EST MOD 30 MIN: CPT | Performed by: INTERNAL MEDICINE

## 2023-05-02 PROCEDURE — 93000 ELECTROCARDIOGRAM COMPLETE: CPT | Performed by: INTERNAL MEDICINE

## 2023-05-02 PROCEDURE — 3017F COLORECTAL CA SCREEN DOC REV: CPT | Performed by: INTERNAL MEDICINE

## 2023-05-02 PROCEDURE — G8427 DOCREV CUR MEDS BY ELIG CLIN: HCPCS | Performed by: INTERNAL MEDICINE

## 2023-05-02 PROCEDURE — 1036F TOBACCO NON-USER: CPT | Performed by: INTERNAL MEDICINE

## 2023-05-02 PROCEDURE — G8417 CALC BMI ABV UP PARAM F/U: HCPCS | Performed by: INTERNAL MEDICINE

## 2023-05-02 RX ORDER — VALSARTAN 40 MG/1
20 TABLET ORAL DAILY
Qty: 90 TABLET | Refills: 3 | Status: SHIPPED | OUTPATIENT
Start: 2023-05-02

## 2023-05-09 ENCOUNTER — TELEPHONE (OUTPATIENT)
Dept: CARDIOLOGY CLINIC | Age: 73
End: 2023-05-09

## 2023-05-09 NOTE — TELEPHONE ENCOUNTER
[place] called after taking the Valsartan medication for 5 days, after OV with Shira Navarro. He states that he felt bad each day and stopped taking it. He states that he does not need a call back, just wanted Dr. Shira Navarro to be aware of his decision.

## 2023-06-28 RX ORDER — FUROSEMIDE 40 MG/1
TABLET ORAL
Qty: 90 TABLET | Refills: 3 | Status: SHIPPED | OUTPATIENT
Start: 2023-06-28

## 2023-06-28 RX ORDER — APIXABAN 5 MG/1
TABLET, FILM COATED ORAL
Qty: 180 TABLET | Refills: 3 | Status: SHIPPED | OUTPATIENT
Start: 2023-06-28

## 2023-09-19 RX ORDER — DILTIAZEM HYDROCHLORIDE 360 MG/1
360 CAPSULE, EXTENDED RELEASE ORAL DAILY
Qty: 90 CAPSULE | Refills: 2 | Status: SHIPPED | OUTPATIENT
Start: 2023-09-19

## 2024-06-17 NOTE — PROGRESS NOTES
ventricular hypertrophy.  Left ventricular systolic   function is at lower limits of normal.  There is global moderate left ventricular   dysfunction, without focal wall motion abnormalities.  The ejection fraction is calculated   at 34%, but is close to 30%.       There is no intracardiac mass, thrombi, or pericardial effusion.     The aortic valve has three cusps and opens normally.  The mitral and tricuspid valves are   grossly normal.  There is mild mitral and tricuspid valvular insufficiency.  There is   trace aortic insufficiency.     There is a tall A wave, consistent with diastolic dysfunction.     Right ventricular systolic pressure is 37 mmHg.       Echo 9/8/21   Overall left ventricular systolic function appears moderately reduced.  Ejection fraction is visually estimated to be 30-40%  No regional wall motion abnormalities are noted.  Indeterminate diastolic function due to abnormal rhythm.  The right ventricle is mildly dilated.  Right ventricular systolic function appears mildly reduced, however,  possibly underestimated due to abnormal rhythm.    Lab Review:   No results found for: \"TRIG\", \"HDL\", \"LDLDIRECT\"  Lab Results   Component Value Date/Time     06/28/2021 12:51 PM    K 4.4 06/28/2021 12:51 PM    BUN 24 06/28/2021 12:51 PM    CREATININE 0.9 06/28/2021 12:51 PM     Assessment/Plan:     1. Atrial fibrillation, persistent  -[id number] Score 2 (CHF, Age)     -Continue Eliquis 5 mg BID for stroke risk reduction   -EKG today Atrial fibrillation   -Asymptomatic     2. Hypothyroidism   -Continue levothyroxine daily  -Continue routine follow up with Dr Marilynn Ibarra ([facility])    3. Chronic systolic heart failure  Non ischemic Cardiomyopathy   - NYHA class II  - EF 30-40% Per Echo 9/8/2021.  -At this current time he is feeling well and he does not wish to change or add any additional medications.  -  Lasix 40 mg PRN.  He is unsure of how often or how much of the lasix he takes.  He was

## 2024-06-18 ENCOUNTER — OFFICE VISIT (OUTPATIENT)
Dept: CARDIOLOGY CLINIC | Age: 74
End: 2024-06-18
Payer: MEDICARE

## 2024-06-18 VITALS
WEIGHT: 211.6 LBS | HEART RATE: 94 BPM | DIASTOLIC BLOOD PRESSURE: 72 MMHG | OXYGEN SATURATION: 98 % | HEIGHT: 70 IN | SYSTOLIC BLOOD PRESSURE: 138 MMHG | BODY MASS INDEX: 30.29 KG/M2

## 2024-06-18 DIAGNOSIS — E03.8 OTHER SPECIFIED HYPOTHYROIDISM: ICD-10-CM

## 2024-06-18 DIAGNOSIS — I50.22 CHRONIC SYSTOLIC CHF (CONGESTIVE HEART FAILURE), NYHA CLASS 2 (HCC): ICD-10-CM

## 2024-06-18 DIAGNOSIS — I48.19 PERSISTENT ATRIAL FIBRILLATION (HCC): Primary | ICD-10-CM

## 2024-06-18 PROCEDURE — 1123F ACP DISCUSS/DSCN MKR DOCD: CPT | Performed by: INTERNAL MEDICINE

## 2024-06-18 PROCEDURE — 1036F TOBACCO NON-USER: CPT | Performed by: INTERNAL MEDICINE

## 2024-06-18 PROCEDURE — 93000 ELECTROCARDIOGRAM COMPLETE: CPT | Performed by: INTERNAL MEDICINE

## 2024-06-18 PROCEDURE — 3017F COLORECTAL CA SCREEN DOC REV: CPT | Performed by: INTERNAL MEDICINE

## 2024-06-18 PROCEDURE — G8427 DOCREV CUR MEDS BY ELIG CLIN: HCPCS | Performed by: INTERNAL MEDICINE

## 2024-06-18 PROCEDURE — 99214 OFFICE O/P EST MOD 30 MIN: CPT | Performed by: INTERNAL MEDICINE

## 2024-06-18 PROCEDURE — G8417 CALC BMI ABV UP PARAM F/U: HCPCS | Performed by: INTERNAL MEDICINE

## 2024-06-20 ENCOUNTER — TELEPHONE (OUTPATIENT)
Dept: CARDIOLOGY CLINIC | Age: 74
End: 2024-06-20

## 2024-06-20 NOTE — TELEPHONE ENCOUNTER
Jeff called in he would like to see if he can be put on Apixaban the generic for Eliquis,because the Eliquis is too costly.      He can be reached at [phone].

## 2024-06-24 RX ORDER — DILTIAZEM HYDROCHLORIDE 360 MG/1
360 CAPSULE, EXTENDED RELEASE ORAL DAILY
Qty: 90 CAPSULE | Refills: 3 | Status: SHIPPED | OUTPATIENT
Start: 2024-06-24

## 2024-07-15 RX ORDER — FUROSEMIDE 40 MG/1
40 TABLET ORAL DAILY
Qty: 90 TABLET | Refills: 3 | Status: SHIPPED | OUTPATIENT
Start: 2024-07-15

## 2025-02-24 RX ORDER — APIXABAN 5 MG/1
5 TABLET, FILM COATED ORAL 2 TIMES DAILY
Qty: 60 TABLET | Refills: 3 | Status: SHIPPED | OUTPATIENT
Start: 2025-02-24

## 2025-02-24 NOTE — TELEPHONE ENCOUNTER
Last OV:2024 Hiren  Next OV: None Pt is to return in a year around 2024.  Pr Is on the recall list.  Labs: BMP 2021  Last [id number]

## 2025-06-23 NOTE — PROGRESS NOTES
Cardiomyopathy   - NYHA class II  - EF 30-40% Per Echo 9/8/2021.  -At this current time he is feeling well and he does not wish to change or add any additional medications.  -  Lasix 40 mg PRN.  He is unsure of how often or how much of the lasix he takes.  He was advised that we do need routine kidney function and electrolyte monitoring, at this time he does not wish to complete the blood work.  -Trial of Valsartan.  Had shortness of breath and chest pain for three days.    - Euvolemic on today's exam  -Discussed completing NM stress and echo for etiology of his cardiomyopathy.  He does not wish to pursue any further testing despite the risks.  -He does not wish to pursue ICD placement.   Offered we repeat an echo but at this time he does not wish to complete as his wife recently in the hospital and he has medical bills.   Discussed ICD placement if needed and patient does not wish to pursue any further    I did offer the Jeff could follow with his primary care physician and continue his Eliquis there.  I explained that he was not interested in the aggressively treating his heart failure.  For some reason, he still wants to follow in my office despite not allwoing me to treat his CHF adequately.     I will see him in the office for follow up in 1 year.     This note was scribed in the presence of Oskar Maradiaga MD by Juana Cassidy RN.    Physician Attestation:  The scribes documentation has been prepared under my direction and personally reviewed by me in its entirety.     I, Dr. Maradiaga personally performed the services described in this documentation as scribed by my RN in my presence, and I confirm that the note above accurately reflects all work, treatment, procedures, and medical decision making performed by me.

## 2025-06-24 ENCOUNTER — OFFICE VISIT (OUTPATIENT)
Dept: CARDIOLOGY CLINIC | Age: 75
End: 2025-06-24
Payer: MEDICARE

## 2025-06-24 VITALS
WEIGHT: 211 LBS | HEART RATE: 97 BPM | HEIGHT: 70 IN | BODY MASS INDEX: 30.21 KG/M2 | OXYGEN SATURATION: 92 % | RESPIRATION RATE: 15 BRPM | SYSTOLIC BLOOD PRESSURE: 130 MMHG | DIASTOLIC BLOOD PRESSURE: 70 MMHG

## 2025-06-24 DIAGNOSIS — I50.22 CHRONIC SYSTOLIC CHF (CONGESTIVE HEART FAILURE), NYHA CLASS 2 (HCC): ICD-10-CM

## 2025-06-24 DIAGNOSIS — E03.8 OTHER SPECIFIED HYPOTHYROIDISM: ICD-10-CM

## 2025-06-24 DIAGNOSIS — I48.19 PERSISTENT ATRIAL FIBRILLATION (HCC): Primary | ICD-10-CM

## 2025-06-24 PROCEDURE — G8427 DOCREV CUR MEDS BY ELIG CLIN: HCPCS | Performed by: INTERNAL MEDICINE

## 2025-06-24 PROCEDURE — 1159F MED LIST DOCD IN RCRD: CPT | Performed by: INTERNAL MEDICINE

## 2025-06-24 PROCEDURE — 93000 ELECTROCARDIOGRAM COMPLETE: CPT | Performed by: INTERNAL MEDICINE

## 2025-06-24 PROCEDURE — G8417 CALC BMI ABV UP PARAM F/U: HCPCS | Performed by: INTERNAL MEDICINE

## 2025-06-24 PROCEDURE — 3017F COLORECTAL CA SCREEN DOC REV: CPT | Performed by: INTERNAL MEDICINE

## 2025-06-24 PROCEDURE — 1036F TOBACCO NON-USER: CPT | Performed by: INTERNAL MEDICINE

## 2025-06-24 PROCEDURE — 1123F ACP DISCUSS/DSCN MKR DOCD: CPT | Performed by: INTERNAL MEDICINE

## 2025-06-24 PROCEDURE — 99214 OFFICE O/P EST MOD 30 MIN: CPT | Performed by: INTERNAL MEDICINE

## 2025-06-24 RX ORDER — DILTIAZEM HYDROCHLORIDE 360 MG/1
360 CAPSULE, EXTENDED RELEASE ORAL DAILY
Qty: 90 CAPSULE | Refills: 3 | Status: SHIPPED | OUTPATIENT
Start: 2025-06-24

## 2025-06-24 RX ORDER — DILTIAZEM HYDROCHLORIDE 360 MG/1
360 CAPSULE, EXTENDED RELEASE ORAL DAILY
Qty: 90 CAPSULE | Refills: 3 | OUTPATIENT
Start: 2025-06-24